# Patient Record
Sex: FEMALE | Race: WHITE | NOT HISPANIC OR LATINO
[De-identification: names, ages, dates, MRNs, and addresses within clinical notes are randomized per-mention and may not be internally consistent; named-entity substitution may affect disease eponyms.]

---

## 2023-06-15 ENCOUNTER — APPOINTMENT (OUTPATIENT)
Dept: NEPHROLOGY | Facility: CLINIC | Age: 78
End: 2023-06-15
Payer: MEDICARE

## 2023-06-15 VITALS
OXYGEN SATURATION: 97 % | HEART RATE: 107 BPM | WEIGHT: 128.97 LBS | BODY MASS INDEX: 23.73 KG/M2 | HEIGHT: 62 IN | SYSTOLIC BLOOD PRESSURE: 157 MMHG | TEMPERATURE: 97.4 F | DIASTOLIC BLOOD PRESSURE: 89 MMHG

## 2023-06-15 DIAGNOSIS — E87.1 HYPO-OSMOLALITY AND HYPONATREMIA: ICD-10-CM

## 2023-06-15 DIAGNOSIS — R80.9 PROTEINURIA, UNSPECIFIED: ICD-10-CM

## 2023-06-15 DIAGNOSIS — Z78.9 OTHER SPECIFIED HEALTH STATUS: ICD-10-CM

## 2023-06-15 DIAGNOSIS — Z85.43 PERSONAL HISTORY OF MALIGNANT NEOPLASM OF OVARY: ICD-10-CM

## 2023-06-15 PROCEDURE — 99205 OFFICE O/P NEW HI 60 MIN: CPT

## 2023-06-15 RX ORDER — SENNOSIDES 8.6 MG/1
8.6 TABLET, COATED ORAL
Refills: 0 | Status: ACTIVE | COMMUNITY

## 2023-06-15 RX ORDER — LORAZEPAM 1 MG/1
1 TABLET ORAL
Refills: 0 | Status: ACTIVE | COMMUNITY

## 2023-06-15 RX ORDER — PREDNISONE 50 MG/1
50 TABLET ORAL
Refills: 0 | Status: ACTIVE | COMMUNITY

## 2023-06-15 RX ORDER — AMLODIPINE BESYLATE 5 MG/1
5 TABLET ORAL
Refills: 0 | Status: ACTIVE | COMMUNITY

## 2023-06-15 RX ORDER — MIRTAZAPINE 7.5 MG/1
7.5 TABLET, FILM COATED ORAL
Refills: 0 | Status: ACTIVE | COMMUNITY

## 2023-06-15 RX ORDER — NIRAPARIB 100 MG/1
CAPSULE ORAL
Refills: 0 | Status: ACTIVE | COMMUNITY

## 2023-06-15 RX ORDER — PROCHLORPERAZINE MALEATE 10 MG/1
10 TABLET ORAL
Refills: 0 | Status: ACTIVE | COMMUNITY

## 2023-06-15 RX ORDER — SERTRALINE HYDROCHLORIDE 50 MG/1
50 TABLET, FILM COATED ORAL
Refills: 0 | Status: ACTIVE | COMMUNITY

## 2023-06-15 RX ORDER — ONDANSETRON HYDROCHLORIDE 24 MG/1
TABLET, FILM COATED ORAL
Refills: 0 | Status: ACTIVE | COMMUNITY

## 2023-06-15 RX ORDER — ALPRAZOLAM 0.25 MG/1
0.25 TABLET ORAL
Refills: 0 | Status: ACTIVE | COMMUNITY

## 2023-06-15 RX ORDER — LEVOTHYROXINE SODIUM 75 UG/1
75 TABLET ORAL
Refills: 0 | Status: ACTIVE | COMMUNITY

## 2023-06-15 RX ORDER — CETIRIZINE HCL 10 MG
TABLET ORAL
Refills: 0 | Status: ACTIVE | COMMUNITY

## 2023-06-15 RX ORDER — DOCUSATE SODIUM 50 MG/1
CAPSULE, LIQUID FILLED ORAL
Refills: 0 | Status: ACTIVE | COMMUNITY

## 2023-06-15 RX ORDER — NORMAL SALT TABLETS 1 G/G
1 TABLET ORAL
Refills: 0 | Status: ACTIVE | COMMUNITY

## 2023-06-15 RX ORDER — MAGNESIUM OXIDE/MAG AA CHELATE 300 MG
CAPSULE ORAL
Refills: 0 | Status: ACTIVE | COMMUNITY

## 2023-06-15 RX ORDER — METOPROLOL SUCCINATE 100 MG
100 TABLET, EXTENDED RELEASE 24 HR ORAL
Refills: 0 | Status: ACTIVE | COMMUNITY

## 2023-06-15 NOTE — HISTORY OF PRESENT ILLNESS
[FreeTextEntry1] : 6/15/23 -- Today I had the pleasure of meeting Gertrude Mckenzie who is a 78 years old woman with ovarian cancer who is here today with her  and son for evaluation of hyponatremia and proteinuria.  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  She has had hypertension for many years which was controlled until recently.  Her oncologic history is significant for BRCA2-mediated, metastatic ovarian cancer which was diagnosed 10 years ago.  In the past she was treated with radical resection of bowel, mesentery, spleen and she was placed on PARP inhibitor (which she is on until this day), and platinum therapy.  This past year she was placed on avastin.  On avastin her radiologic disease has been stable but her CA-125 has been increasing. She has been off for a few months now due to concern for rising proteinuria.  Of note her blood pressure recently is also worsening.  She has no chest pain no shortness of breath no nausea no vomiting no diarrhea at the moment.  She reports some bubbles in her urine but no facial edema.

## 2023-06-15 NOTE — CONSULT LETTER
[Dear  ___] : Dear  [unfilled], [Consult Letter:] : I had the pleasure of evaluating your patient, [unfilled]. [Sincerely,] : Sincerely, [FreeTextEntry3] : Eleazar

## 2023-06-15 NOTE — PHYSICAL EXAM
[General Appearance - Alert] : alert [Sclera] : the sclera and conjunctiva were normal [Hearing Threshold Finger Rub Not [place]] : hearing was normal [Jugular Venous Distention Increased] : there was no jugular-venous distention [Auscultation Breath Sounds / Voice Sounds] : lungs were clear to auscultation bilaterally [Heart Sounds] : normal S1 and S2 [Edema] : there was no peripheral edema [Abdomen Soft] : soft [No CVA Tenderness] : no ~M costovertebral angle tenderness [Abnormal Walk] : normal gait [FreeTextEntry1] : memory is impaired.

## 2023-06-15 NOTE — ASSESSMENT
[FreeTextEntry1] : 78 years old woman on Bevacizumab here for evaluation of proteinuria and hypertension\par \par Proteinuria -- The patient's rise in proteinuria with recent increase in blood pressure but normal hgb and platelets is suggestive of a kidney-limited thrombotic microangiopathy.  This is known with bevacizumab and the temporal correlation fits in her case.  Our plan today will be to send a work up for proteinuria including FELICIA, PLA2R, etc to make sure that we are covering the differential but will also send blood work for complement-mediate TMA.  At this point given the uncontrolled hypertension, proteinuria and concern for TMA it is reasonable to increase losartan to 100 mg po daily.  Today we spoke about the role of losartan on creatinine and its role in preventing kidney function decline / proteinuria.  I also counseled the patient and family on the importance of a "medication holiday" during periods of stress / dehydration.  \par \par Hyponatremia -- Will repeat serum sodium along with osmolality today.\par \par Hypertension - uncontrolled. monitor home bp daily increase losartan as above.  do not need to repeat potassium in two weeks given normal renal function.\par \par The time spent is inclusive of the time it took to see, evaluate, and manage the patient.  Time is inclusive of the time taken to review chart, reconcile medications, document findings, and communicate with other providers (when applicable).\par

## 2023-06-16 ENCOUNTER — NON-APPOINTMENT (OUTPATIENT)
Age: 78
End: 2023-06-16

## 2023-06-16 LAB
ALBUMIN SERPL ELPH-MCNC: 5 G/DL
ALDOSTERONE SERUM: 12.8 NG/DL
ANION GAP SERPL CALC-SCNC: 25 MMOL/L
BUN SERPL-MCNC: 22 MG/DL
CALCIUM SERPL-MCNC: 10.9 MG/DL
CANCER AG125 SERPL-ACNC: 486 U/ML
CH50 SERPL-MCNC: 77 U/ML
CHLORIDE SERPL-SCNC: 94 MMOL/L
CO2 SERPL-SCNC: 17 MMOL/L
CREAT SERPL-MCNC: 0.87 MG/DL
CREAT SPEC-SCNC: 72 MG/DL
CREAT SPEC-SCNC: 72 MG/DL
CREAT/PROT UR: 4.5 RATIO
DEPRECATED KAPPA LC FREE/LAMBDA SER: 1.72 RATIO
EGFR: 68 ML/MIN/1.73M2
GLUCOSE SERPL-MCNC: 170 MG/DL
HAPTOGLOB SERPL-MCNC: 171 MG/DL
HCT VFR BLD CALC: 44.7 %
HGB BLD-MCNC: 14.8 G/DL
KAPPA LC CSF-MCNC: 0.75 MG/DL
KAPPA LC SERPL-MCNC: 1.29 MG/DL
LDH SERPL-CCNC: 218 U/L
MCHC RBC-ENTMCNC: 33.1 GM/DL
MCHC RBC-ENTMCNC: 33.4 PG
MCV RBC AUTO: 100.9 FL
MICROALBUMIN 24H UR DL<=1MG/L-MCNC: 215.8 MG/DL
MICROALBUMIN/CREAT 24H UR-RTO: 3005 MG/G
OSMOLALITY UR: 596 MOSM/KG
PHOSPHATE SERPL-MCNC: 2.6 MG/DL
PLATELET # BLD AUTO: 311 K/UL
POTASSIUM SERPL-SCNC: 5.1 MMOL/L
POTASSIUM UR-SCNC: 39.7 MMOL/L
PROT UR-MCNC: 321 MG/DL
RBC # BLD: 4.43 M/UL
RBC # FLD: 14.3 %
SODIUM ?TM SUB UR QN: 22 MMOL/L
SODIUM SERPL-SCNC: 135 MMOL/L
WBC # FLD AUTO: 11.25 K/UL

## 2023-06-16 RX ORDER — LOSARTAN POTASSIUM 100 MG/1
100 TABLET, FILM COATED ORAL DAILY
Qty: 90 | Refills: 3 | Status: ACTIVE | COMMUNITY
Start: 1900-01-01 | End: 1900-01-01

## 2023-06-19 LAB
ANA PAT FLD IF-IMP: ABNORMAL
ANA SER IF-ACNC: ABNORMAL
COMPLEMENT, ALTERNATE PATHWAY (AH50): 89
OSMOLALITY SERPL: 277 MOSMOL/KG

## 2023-06-20 LAB
AD13ACT COM: NORMAL
ADAMTS13 ACTIVITY: 97.7 %
PHOSPHOLIPASE A2 RECEPTOR ELISA: <1.8 RU/ML

## 2023-06-30 LAB — RENIN ACTIVITY, PLASMA: 6.98 NG/ML/HR

## 2023-09-21 ENCOUNTER — APPOINTMENT (OUTPATIENT)
Dept: NEPHROLOGY | Facility: CLINIC | Age: 78
End: 2023-09-21